# Patient Record
Sex: FEMALE | Race: WHITE | NOT HISPANIC OR LATINO | Employment: UNEMPLOYED | URBAN - NONMETROPOLITAN AREA
[De-identification: names, ages, dates, MRNs, and addresses within clinical notes are randomized per-mention and may not be internally consistent; named-entity substitution may affect disease eponyms.]

---

## 2018-08-09 ENCOUNTER — CONSULT (OUTPATIENT)
Dept: CARDIOLOGY | Facility: CLINIC | Age: 63
End: 2018-08-09

## 2018-08-09 VITALS
WEIGHT: 110 LBS | BODY MASS INDEX: 18.78 KG/M2 | HEIGHT: 64 IN | SYSTOLIC BLOOD PRESSURE: 120 MMHG | DIASTOLIC BLOOD PRESSURE: 72 MMHG | HEART RATE: 58 BPM

## 2018-08-09 DIAGNOSIS — I77.9 CAROTID ARTERY DISEASE, UNSPECIFIED LATERALITY (HCC): ICD-10-CM

## 2018-08-09 DIAGNOSIS — E78.00 HYPERCHOLESTEREMIA: ICD-10-CM

## 2018-08-09 DIAGNOSIS — I73.9 PAD (PERIPHERAL ARTERY DISEASE) (HCC): ICD-10-CM

## 2018-08-09 DIAGNOSIS — R12 HEART BURN: ICD-10-CM

## 2018-08-09 DIAGNOSIS — Z72.0 TOBACCO ABUSE: ICD-10-CM

## 2018-08-09 DIAGNOSIS — I10 ESSENTIAL HYPERTENSION: ICD-10-CM

## 2018-08-09 DIAGNOSIS — R00.1 BRADYCARDIA: ICD-10-CM

## 2018-08-09 DIAGNOSIS — I25.6 SILENT MYOCARDIAL ISCHEMIA: ICD-10-CM

## 2018-08-09 DIAGNOSIS — I25.9 IHD (ISCHEMIC HEART DISEASE): Primary | ICD-10-CM

## 2018-08-09 DIAGNOSIS — R01.1 MURMUR, CARDIAC: ICD-10-CM

## 2018-08-09 DIAGNOSIS — K21.9 GASTROESOPHAGEAL REFLUX DISEASE, ESOPHAGITIS PRESENCE NOT SPECIFIED: ICD-10-CM

## 2018-08-09 PROCEDURE — 99204 OFFICE O/P NEW MOD 45 MIN: CPT | Performed by: INTERNAL MEDICINE

## 2018-08-09 PROCEDURE — 93000 ELECTROCARDIOGRAM COMPLETE: CPT | Performed by: INTERNAL MEDICINE

## 2018-08-09 PROCEDURE — 99406 BEHAV CHNG SMOKING 3-10 MIN: CPT | Performed by: INTERNAL MEDICINE

## 2018-08-09 RX ORDER — ATORVASTATIN CALCIUM 20 MG/1
20 TABLET, FILM COATED ORAL DAILY
Qty: 30 TABLET | Refills: 11 | Status: SHIPPED | OUTPATIENT
Start: 2018-08-09 | End: 2019-09-14 | Stop reason: SDUPTHER

## 2018-08-09 RX ORDER — ASPIRIN 81 MG/1
81 TABLET ORAL DAILY
COMMUNITY
End: 2020-02-26 | Stop reason: SDUPTHER

## 2018-08-09 RX ORDER — QUETIAPINE FUMARATE 300 MG/1
300 TABLET, FILM COATED ORAL NIGHTLY
COMMUNITY

## 2018-08-09 RX ORDER — HYDROCHLOROTHIAZIDE 25 MG/1
25 TABLET ORAL DAILY
COMMUNITY
End: 2020-02-26 | Stop reason: SDUPTHER

## 2018-08-09 RX ORDER — PANTOPRAZOLE SODIUM 40 MG/1
40 TABLET, DELAYED RELEASE ORAL DAILY
COMMUNITY

## 2018-08-09 RX ORDER — SUCRALFATE 1 G/1
1 TABLET ORAL 4 TIMES DAILY
COMMUNITY
End: 2020-02-26 | Stop reason: ALTCHOICE

## 2018-08-09 RX ORDER — AMLODIPINE BESYLATE 10 MG/1
10 TABLET ORAL DAILY
COMMUNITY
End: 2020-02-26 | Stop reason: SDUPTHER

## 2018-08-09 RX ORDER — VARENICLINE TARTRATE 1 MG/1
1 TABLET, FILM COATED ORAL 2 TIMES DAILY
COMMUNITY
End: 2019-06-26 | Stop reason: ALTCHOICE

## 2018-08-09 RX ORDER — METOPROLOL SUCCINATE 25 MG/1
25 TABLET, EXTENDED RELEASE ORAL 2 TIMES DAILY
COMMUNITY
End: 2020-02-26 | Stop reason: SDUPTHER

## 2018-08-09 RX ORDER — GABAPENTIN 300 MG/1
300 CAPSULE ORAL 3 TIMES DAILY
COMMUNITY

## 2018-08-09 RX ORDER — BUSPIRONE HYDROCHLORIDE 15 MG/1
15 TABLET ORAL 2 TIMES DAILY PRN
COMMUNITY

## 2018-08-09 RX ORDER — ONDANSETRON 4 MG/1
4 TABLET, FILM COATED ORAL EVERY 8 HOURS PRN
COMMUNITY

## 2018-08-09 NOTE — PROGRESS NOTES
CARDIAC COMPLAINTS  dyspnea, fatigue, heartburn and leg pain      Subjective   Vikki Herrmann is a 62 y.o. female came in today for her initial cardiac evaluation.  She has history of ischemic heart disease, history of peripheral was  been in [place] till recently.  She moved to [place] and wants to be followed here.  She is not sure where she had most of her procedures.  We been able to obtain some reports from [facility].  She was first diagnosed with peripheral vascular disease in the form of significant distal aortic stenosis for which she possibly had aorta bifemoral bypass surgery.  She then underwent multiple stenting of the legs because of worsening of the graft as well as the native vessel.  About a year ago, she was admitted with chest pain and shortness of breath.  And underwent stress test which was positive.  She had cardiac catheterization and stenting of the right coronary artery.  She was also diagnosed with significant carotid artery disease.  She did not follow-up with her vascular surgeon regarding that.  She is not taking any statins, she is not taking any antiplatelet medication.  She is not even sure when she had the stents put in.  She think it was early last year.  She apparently had labs done and was told her cholesterol is mildly elevated.  She unfortunately still continues to smoke.    Past Surgical History:   Procedure Laterality Date   • CARDIAC CATHETERIZATION  10/16/2017    @ [place]. 85% Ostial RCA-3.25x15 Xience       Current Outpatient Prescriptions   Medication Sig Dispense Refill   • amLODIPine (NORVASC) 10 MG tablet Take 10 mg by mouth Daily.     • aspirin 81 MG EC tablet Take 81 mg by mouth Daily.     • busPIRone (BUSPAR) 15 MG tablet Take 15 mg by mouth 2 (Two) Times a Day As Needed.     • gabapentin (NEURONTIN) 300 MG capsule Take 300 mg by mouth 3 (Three) Times a Day.     • hydrochlorothiazide (HYDRODIURIL) 25 MG tablet Take 25 mg by mouth Daily.     • metoprolol  succinate XL (TOPROL-XL) 25 MG 24 hr tablet Take 25 mg by mouth 2 (Two) Times a Day.     • ondansetron (ZOFRAN) 4 MG tablet Take 4 mg by mouth Every 8 (Eight) Hours As Needed for Nausea or Vomiting.     • pantoprazole (PROTONIX) 40 MG EC tablet Take 40 mg by mouth Daily.     • QUEtiapine (SEROquel) 300 MG tablet Take 300 mg by mouth Every Night.     • sucralfate (CARAFATE) 1 g tablet Take 1 g by mouth 4 (Four) Times a Day.     • varenicline (CHANTIX) 1 MG tablet Take 1 mg by mouth 2 (Two) Times a Day.     • atorvastatin (LIPITOR) 20 MG tablet Take 1 tablet by mouth Daily. 30 tablet 11   • nicotine (NICOTROL) 10 MG inhaler Inhale 1 puff As Needed for Smoking Cessation. 168 inhaler 5   • ticagrelor (BRILINTA) 60 MG tablet tablet Take 1 tablet by mouth 2 (Two) Times a Day. 60 tablet 8     No current facility-administered medications for this visit.            ALLERGIES:  Patient has no known allergies.    Past Medical History:   Diagnosis Date   • Anxiety    • Coronary artery disease     s/p stenting    • Depression    • GERD (gastroesophageal reflux disease)    • History of cholecystectomy    • History of partial hysterectomy    • Hyperlipidemia    • Hypertension    • PVD (peripheral vascular disease) (CMS/HCC)     s/p multiple stents   • S/P AAA repair    • Uterine cancer (CMS/HCC)        History   Smoking Status   • Current Every Day Smoker   • Packs/day: 0.25   • Years: 50.00   • Types: Cigarettes   Smokeless Tobacco   • Never Used          Family History   Problem Relation Age of Onset   • Stroke Mother    • Cancer Father        Review of Systems   Constitution: Positive for weakness. Negative for decreased appetite and malaise/fatigue.   HENT: Negative for congestion and sore throat.    Eyes: Negative for blurred vision.   Cardiovascular: Positive for dyspnea on exertion. Negative for chest pain.   Respiratory: Positive for shortness of breath. Negative for snoring.    Endocrine: Negative for cold intolerance and  "heat intolerance.   Hematologic/Lymphatic: Negative for adenopathy. Does not bruise/bleed easily.   Skin: Negative for itching, nail changes and skin cancer.   Musculoskeletal: Positive for arthritis and muscle weakness. Negative for myalgias.   Gastrointestinal: Negative for abdominal pain, dysphagia and heartburn.   Genitourinary: Negative for bladder incontinence and frequency.   Neurological: Negative for dizziness, light-headedness, seizures and vertigo.   Psychiatric/Behavioral: Negative for altered mental status.   Allergic/Immunologic: Negative for environmental allergies and hives.       Diabetes- No  Thyroid- normal    Objective     /72 (BP Location: Right arm)   Pulse 58   Ht 162.6 cm (64\")   Wt 49.9 kg (110 lb)   BMI 18.88 kg/m²     Physical Exam   Constitutional: She is oriented to person, place, and time. She appears well-developed and well-nourished.   HENT:   Head: Normocephalic.   Eyes: Pupils are equal, round, and reactive to light. EOM are normal.   Neck: Normal range of motion. Neck supple. Carotid bruit is present.   Cardiovascular: Normal rate, regular rhythm, S1 normal and S2 normal.    Murmur heard.  Pulmonary/Chest: Effort normal and breath sounds normal.   Abdominal: Soft. Bowel sounds are normal.   Musculoskeletal: Normal range of motion. She exhibits no edema.   Neurological: She is alert and oriented to person, place, and time.   Skin: Skin is warm and dry.   Psychiatric: She has a normal mood and affect.         ECG 12 Lead  Date/Time: 8/9/2018 5:52 PM  Performed by: RUDDY PARKINSON  Authorized by: RUDDY PARKINSON   Previous ECG: no previous ECG available  Rhythm: sinus bradycardia  Rate: bradycardic  QRS axis: normal  Clinical impression: non-specific ECG              Assessment/Plan   Patient's Body mass index is 18.88 kg/m². BMI is within normal parameters. No follow-up required.     Vikki was seen today for establish care, coronary artery disease, pvd, carotid " artery disease, post-op aneurysm repair and labs.    Diagnoses and all orders for this visit:    IHD (ischemic heart disease)  -     ticagrelor (BRILINTA) 60 MG tablet tablet; Take 1 tablet by mouth 2 (Two) Times a Day.  -     Stress Test With Myocardial Perfusion One Day; Future    PAD (peripheral artery disease) (CMS/HCC)  -     ticagrelor (BRILINTA) 60 MG tablet tablet; Take 1 tablet by mouth 2 (Two) Times a Day.    Silent myocardial ischemia  -     Stress Test With Myocardial Perfusion One Day; Future    Essential hypertension    Carotid artery disease, unspecified laterality (CMS/HCC)    Tobacco abuse  -     nicotine (NICOTROL) 10 MG inhaler; Inhale 1 puff As Needed for Smoking Cessation.    Bradycardia  -     Adult Transthoracic Echo Complete W/ Cont if Necessary Per Protocol; Future    Murmur, cardiac  -     Adult Transthoracic Echo Complete W/ Cont if Necessary Per Protocol; Future    Hypercholesteremia  -     atorvastatin (LIPITOR) 20 MG tablet; Take 1 tablet by mouth Daily.    Gastroesophageal reflux disease, esophagitis presence not specified    Heart burn  -     Stress Test With Myocardial Perfusion One Day; Future       At baseline her heart rate and blood pressure appears stable.  Her BMI is 19.  Her EKG showed sinus bradycardia with nonspecific ST-T changes. Her clinical examination reveals decreased circulation in her lower extremities.  I had a very long talk with her about the tobacco use.  She agreed to try quitting with the use of Nicotrol inhaler.  I also started her on Lipitor 20 mg once a day.  Because of the ischemic heart disease and peripheral spread disease I started her on Brilinta at 60 mg twice a day.  After she left our office, I was able to get the catheterization report and it was done in October of last year.  So I need to increase to dose of medication to 90 mg twice a day.  I also scheduled her to undergo an echocardiogram to evaluate the LV function.  She also need to repeat a  stress test to rule out any ischemia.  She does have significant carotid bruit.  If she is not able to find a CTA CD, then she may need to undergo a CTA again.  I will see her back in few months or sooner if needed.             Electronically signed by Luis Miguel Rodríguez MD August 9, 2018 5:44 PM

## 2018-08-09 NOTE — PROGRESS NOTES
I advised Vikki of the risks of continuing to use tobacco, and I provided her with tobacco cessation educational materials in the After Visit Summary.     During this visit, I spent 4-5 minutes counseling the patient regarding tobacco cessation.

## 2018-08-10 ENCOUNTER — TELEPHONE (OUTPATIENT)
Dept: CARDIOLOGY | Facility: CLINIC | Age: 63
End: 2018-08-10

## 2018-08-10 NOTE — TELEPHONE ENCOUNTER
LM for pt to call us back if she still could not find the CD,  that the test would need to be repeated.

## 2018-08-10 NOTE — TELEPHONE ENCOUNTER
Spoke with pt, confirmed that she is not on any other antiplatelet except ASA. Advised her that since her stenting was Oct 2017, within the past year,  we would be sending in Brilinta 90 mg BID instead of 60 mg BID, to double check with the pharmacy to make sure they gave her the correct dose.  States to let you know she has not found her CD for her carotid testing yet. Script sent to pharmacy.

## 2018-08-16 ENCOUNTER — APPOINTMENT (OUTPATIENT)
Dept: CARDIOLOGY | Facility: HOSPITAL | Age: 63
End: 2018-08-16
Attending: INTERNAL MEDICINE

## 2018-08-21 NOTE — TELEPHONE ENCOUNTER
Spoke with patient.  She is going to contact the hospital she had the Carotid CTA completed to get a copy of report/CD.

## 2018-08-22 ENCOUNTER — HOSPITAL ENCOUNTER (OUTPATIENT)
Dept: CARDIOLOGY | Facility: HOSPITAL | Age: 63
Discharge: HOME | End: 2018-08-22
Attending: INTERNAL MEDICINE

## 2018-08-22 DIAGNOSIS — R12 HEART BURN: ICD-10-CM

## 2018-08-22 DIAGNOSIS — I25.9 IHD (ISCHEMIC HEART DISEASE): ICD-10-CM

## 2018-08-22 DIAGNOSIS — I25.6 SILENT MYOCARDIAL ISCHEMIA: ICD-10-CM

## 2018-08-22 DIAGNOSIS — R00.1 BRADYCARDIA: ICD-10-CM

## 2018-08-22 DIAGNOSIS — R01.1 MURMUR, CARDIAC: ICD-10-CM

## 2018-08-22 LAB
MAXIMAL PREDICTED HEART RATE: 158 BPM
MAXIMAL PREDICTED HEART RATE: 158 BPM
STRESS TARGET HR: 134 BPM
STRESS TARGET HR: 134 BPM

## 2018-08-22 PROCEDURE — 93018 CV STRESS TEST I&R ONLY: CPT | Performed by: INTERNAL MEDICINE

## 2018-08-22 PROCEDURE — 0 TECHNETIUM SESTAMIBI: Performed by: INTERNAL MEDICINE

## 2018-08-22 PROCEDURE — 93306 TTE W/DOPPLER COMPLETE: CPT

## 2018-08-22 PROCEDURE — 78452 HT MUSCLE IMAGE SPECT MULT: CPT | Performed by: INTERNAL MEDICINE

## 2018-08-22 PROCEDURE — 78452 HT MUSCLE IMAGE SPECT MULT: CPT

## 2018-08-22 PROCEDURE — A9500 TC99M SESTAMIBI: HCPCS | Performed by: INTERNAL MEDICINE

## 2018-08-22 PROCEDURE — 93306 TTE W/DOPPLER COMPLETE: CPT | Performed by: INTERNAL MEDICINE

## 2018-08-22 PROCEDURE — 25010000002 REGADENOSON 0.4 MG/5ML SOLUTION: Performed by: INTERNAL MEDICINE

## 2018-08-22 PROCEDURE — 93017 CV STRESS TEST TRACING ONLY: CPT

## 2018-08-22 RX ADMIN — REGADENOSON 0.4 MG: 0.08 INJECTION, SOLUTION INTRAVENOUS at 09:02

## 2018-08-22 RX ADMIN — TECHNETIUM TC 99M SESTAMIBI 1 DOSE: 1 INJECTION INTRAVENOUS at 09:02

## 2018-08-24 ENCOUNTER — TELEPHONE (OUTPATIENT)
Dept: CARDIOLOGY | Facility: CLINIC | Age: 63
End: 2018-08-24

## 2018-08-24 NOTE — TELEPHONE ENCOUNTER
Attempted to notify patient of echo and stress test results unable to reach via phone, left message for patient to call back.

## 2018-08-29 RX ORDER — ISOSORBIDE MONONITRATE 30 MG/1
30 TABLET, EXTENDED RELEASE ORAL DAILY
Qty: 30 TABLET | Refills: 3 | Status: SHIPPED | OUTPATIENT
Start: 2018-08-29 | End: 2019-01-25 | Stop reason: SDUPTHER

## 2018-08-29 NOTE — TELEPHONE ENCOUNTER
Patient aware of stress test and echo results and recommendations.  Add Imdur 30 mg once a day and patient aware to call if symptoms persist.

## 2019-01-25 RX ORDER — ISOSORBIDE MONONITRATE 30 MG/1
30 TABLET, EXTENDED RELEASE ORAL DAILY
Qty: 30 TABLET | Refills: 0 | Status: SHIPPED | OUTPATIENT
Start: 2019-01-25 | End: 2019-03-11 | Stop reason: SDUPTHER

## 2019-02-21 RX ORDER — ISOSORBIDE MONONITRATE 30 MG/1
TABLET, EXTENDED RELEASE ORAL
Qty: 90 TABLET | OUTPATIENT
Start: 2019-02-21

## 2019-03-11 ENCOUNTER — TELEPHONE (OUTPATIENT)
Dept: CARDIOLOGY | Facility: CLINIC | Age: 64
End: 2019-03-11

## 2019-03-11 RX ORDER — ISOSORBIDE MONONITRATE 30 MG/1
30 TABLET, EXTENDED RELEASE ORAL DAILY
Qty: 30 TABLET | Refills: 0 | Status: SHIPPED | OUTPATIENT
Start: 2019-03-11 | End: 2019-04-22 | Stop reason: SDUPTHER

## 2019-03-11 NOTE — TELEPHONE ENCOUNTER
Patient called needing refills on Isosorbide. She has been unable to keep appointments due to transportation issues.    Is it ok to send script for  Isosorbide?

## 2019-03-11 NOTE — TELEPHONE ENCOUNTER
Script for 30 days supply sent to [facility] pharmacy.

## 2019-04-22 ENCOUNTER — TELEPHONE (OUTPATIENT)
Dept: CARDIOLOGY | Facility: CLINIC | Age: 64
End: 2019-04-22

## 2019-04-22 RX ORDER — ISOSORBIDE MONONITRATE 30 MG/1
30 TABLET, EXTENDED RELEASE ORAL DAILY
Qty: 30 TABLET | Refills: 0 | Status: SHIPPED | OUTPATIENT
Start: 2019-04-22 | End: 2019-05-15 | Stop reason: SDUPTHER

## 2019-04-22 NOTE — TELEPHONE ENCOUNTER
-Pt has an appointment to be seen May 15th at [place], will refill Imdur until that visit. Pt aware that if she is unable to get to that appointment she will need to check with PCP regarding further refills.

## 2019-05-15 ENCOUNTER — TELEPHONE (OUTPATIENT)
Dept: CARDIOLOGY | Facility: CLINIC | Age: 64
End: 2019-05-15

## 2019-05-15 RX ORDER — ISOSORBIDE MONONITRATE 30 MG/1
TABLET, EXTENDED RELEASE ORAL
Qty: 30 TABLET | Refills: 1 | Status: SHIPPED | OUTPATIENT
Start: 2019-05-15 | End: 2020-02-26 | Stop reason: SDUPTHER

## 2019-05-15 NOTE — TELEPHONE ENCOUNTER
PATIENT CAME IN FOR HER APT IN [place].  SHE HAD NOT GOTTEN THE LETTER TO RESCHEDULE HER APT.  (HER PHONE IS SHUT OFF A FEW DAYS A MONTH DUE TO BILLING).  SHE HAD MISSED/CANCELED DUE TO TRANSPORTATION ALL APT'S SINCE HER CONSULT 8-19-18.   WE RESCHEDULED HER APT FOR June, DUE TO HER BEING OUT OF TOWN.  CAN YOU CALL IN REFILL ON ISOSORBIDE TO LAST HER UNTIL 6-26-19 TO [facility]

## 2019-06-21 NOTE — PROGRESS NOTES
Chief Complaint   Patient presents with   • Follow-up     For cardiac management. Patient is on aspirin. Reports that she does sometimes have shortness of breath with going up steps. States that her legs feels like they are getting worse. Last lab work was done earlier this month when she went to ER and found that potassium was low, in chart under labs.    • Med Refill     Needs refills on Brilinta. 90 day supplies to [facility] in [place].        Cardiac Complaints  claudication and dyspnea      Subjective   Vikki Herrmann is a 63 y.o. female with HTN, hyperlipidemia, PVD, and ischemic heart disease.  She was followed in [place] till moving to [place] in 2018. She is not sure where she had most of her procedures, but we were able to obtain some reports from [facility].  She was first diagnosed with peripheral vascular disease in the form of significant distal aortic stenosis for which she possibly had aorta bifemoral bypass surgery.  She then underwent multiple stenting of the legs because of worsening grafts as well as the native vessel.  In 2017, she underwent stress test which was positive.  She had a cardiac catheterization and stenting of the right coronary artery.  She was also diagnosed with significant carotid artery disease.  She did not follow-up with her vascular surgeon regarding that.  At consult in 2018, she was not taking statins or antiplatelet therapy.  She was started on Brilinta 90mg BID at consult as well as statin therapy.  She was urged to repeat cardiac workup with stress and echo.  Stress showed small inferior wall ischemia and good LV function.  Imdur was added.  Carotid bruit was also noted at visit and it was advised if CTA films could not be found, CTA would need to be repeated.  She return today for follow up and reports more claudication pain of bilateral legs.  She states it feels like an ache in her legs and a tightness.  She does admit her potassium has been low  which she thinks is making it worse, as she will often cramp at night.  She does report some weakness of her legs occasionally.  Most recent labs were done at ER a few weeks ago when she went in for cramping, no copy available.  Refills of Brilinta requested for 90 day supply.  She does admit to continued tobacco abuse and smokes about 1/2 pack a day despite concerns.            Cardiac History  Past Surgical History:   Procedure Laterality Date   • CARDIAC CATHETERIZATION  10/16/2017    @ [place]. 85% Ostial RCA-3.25x15 Xience   • CARDIOVASCULAR STRESS TEST  08/22/2018    L. Cardiolite- EF 62%. Small Inferior Ischemia.   • ECHO - CONVERTED  10/09/2017    @ [place]. EF 55%. Anterior & anterolateral WMA   • ECHO - CONVERTED  08/22/2018    EF 65%. Mild MR & AI.       Current Outpatient Medications   Medication Sig Dispense Refill   • amLODIPine (NORVASC) 10 MG tablet Take 10 mg by mouth Daily.     • aspirin 81 MG EC tablet Take 81 mg by mouth Daily.     • atorvastatin (LIPITOR) 20 MG tablet Take 1 tablet by mouth Daily. 30 tablet 11   • busPIRone (BUSPAR) 15 MG tablet Take 15 mg by mouth 2 (Two) Times a Day As Needed.     • gabapentin (NEURONTIN) 300 MG capsule Take 300 mg by mouth 3 (Three) Times a Day.     • hydrochlorothiazide (HYDRODIURIL) 25 MG tablet Take 25 mg by mouth Daily.     • isosorbide mononitrate (IMDUR) 30 MG 24 hr tablet take 1 tablet by mouth once daily -- MUST MAKE APPT FOR MORE REFILLS 30 tablet 1   • linaclotide (LINZESS) 290 MCG capsule capsule Take 290 mcg by mouth Every Morning Before Breakfast.     • metoprolol succinate XL (TOPROL-XL) 25 MG 24 hr tablet Take 25 mg by mouth 2 (Two) Times a Day.     • naproxen (NAPROSYN) 375 MG tablet Take 375 mg by mouth 2 (Two) Times a Day.     • nicotine (NICOTROL) 10 MG inhaler Inhale 1 puff As Needed for Smoking Cessation. 168 inhaler 5   • ondansetron (ZOFRAN) 4 MG tablet Take 4 mg by mouth Every 8 (Eight) Hours As Needed for Nausea or Vomiting.     • pantoprazole  (PROTONIX) 40 MG EC tablet Take 40 mg by mouth Daily.     • potassium chloride (MICRO-K) 10 MEQ CR capsule Take 40 mEq by mouth 2 (Two) Times a Day.     • QUEtiapine (SEROquel) 300 MG tablet Take 300 mg by mouth Every Night.     • sucralfate (CARAFATE) 1 g tablet Take 1 g by mouth 4 (Four) Times a Day.     • ticagrelor (BRILINTA) 90 MG tablet tablet Take 1 tablet by mouth 2 (Two) Times a Day. 60 tablet 6     No current facility-administered medications for this visit.        Patient has no known allergies.    Past Medical History:   Diagnosis Date   • Anxiety    • Coronary artery disease     s/p stenting    • Depression    • GERD (gastroesophageal reflux disease)    • History of cholecystectomy    • History of partial hysterectomy    • Hyperlipidemia    • Hypertension    • PVD (peripheral vascular disease) (CMS/[facility])     s/p multiple stents   • S/P AAA repair    • Uterine cancer (CMS/[facility])        Social History     Socioeconomic History   • Marital status:      Spouse name: Not on file   • Number of children: Not on file   • Years of education: Not on file   • Highest education level: Not on file   Tobacco Use   • Smoking status: Current Every Day Smoker     Packs/day: 0.50     Years: 50.00     Pack years: 25.00     Types: Cigarettes   • Smokeless tobacco: Never Used   Substance and Sexual Activity   • Alcohol use: No     Frequency: Never   • Drug use: Yes     Frequency: 14.0 times per week     Types: Marijuana       Family History   Problem Relation Age of Onset   • Stroke Mother    • Cancer Father        Review of Systems   Constitution: Negative for weakness and malaise/fatigue.   Cardiovascular: Positive for claudication and dyspnea on exertion. Negative for chest pain, irregular heartbeat, leg swelling, near-syncope, orthopnea, palpitations and syncope.   Respiratory: Positive for shortness of breath. Negative for cough and wheezing.    Musculoskeletal: Positive for myalgias. Negative for back pain and  "joint pain.   Gastrointestinal: Negative for anorexia, heartburn, nausea and vomiting.   Genitourinary: Negative for dysuria, hematuria, hesitancy and nocturia.   Neurological: Negative for dizziness, light-headedness and loss of balance.   Psychiatric/Behavioral: Negative for depression and memory loss. The patient is nervous/anxious.            Objective     /90 (BP Location: Right arm)   Pulse 68   Ht 162.6 cm (64.02\")   Wt 50.3 kg (111 lb)   BMI 19.04 kg/m²     Physical Exam   Constitutional: She is oriented to person, place, and time. She appears well-developed and well-nourished.   HENT:   Head: Normocephalic and atraumatic.   Eyes: EOM are normal. Pupils are equal, round, and reactive to light.   Neck: Normal range of motion. Neck supple.   Cardiovascular: Normal rate and regular rhythm.   Murmur heard.  Pulmonary/Chest: Effort normal and breath sounds normal.   Abdominal: Soft.   Musculoskeletal: Normal range of motion.   Neurological: She is alert and oriented to person, place, and time.   Skin: Skin is warm and dry.   Psychiatric: She has a normal mood and affect. Her behavior is normal.       Procedures    Assessment/Plan     HR is stable today and regular.  Blood pressure is elevated at 152/90 but patient denies taking any of her medication yet today.  She was urged to take when she gets home to [facility] with elevated reading.  For now, same HTN regimen advised.  If remains elevated after medication therapy, patient will call for further recommendations.  She reports more claudication pain and weakness in her legs bilaterally.  Repeat CTA of abdomen with runoff recommended to assess areas of stenting, for stenosis, and to assess for AAA.  Order for CTA of carotids also advised as significant stenosis noted on carotid US without CTA for review and carotid bruits auscultated over both carotid arteries.  More recommendations to follow.  She is taking both her Brilinta therapy and ASA for CAD and PVD " management.  Bleeding and bruising denied. Imdur therapy continued for abnormal stress testing, chest pain denied. She is now back on her statin therapy as well for hyperlipidemia management and tolerance is reported.   Labs for hyperlipidemia she reports with your office, can we have most recent for review?  She does admit to some shortness of breath on exertion or climbing.  Patient reports this is nothing new or out of the ordinary, she attributes to ongoing tobacco use.  She does report some leg cramping and myalgias but thinks this is because her potassium has been low, but is now being replaced.  Smoking cessation encouraged once again, but she does not feel ready to quit.  BMI noted at 19.04, she was urged about dietary supplementation with protein shakes.  Unsure if she has been evaluated with CT scan of chest as she is a smoker. 6 month follow up recommended or sooner if needed.      Problems Addressed this Visit        Cardiovascular and Mediastinum    IHD (ischemic heart disease)    Hypercholesteremia    Carotid artery disease (CMS/HCC)    Relevant Orders    CT Angiogram Carotids    Comprehensive Metabolic Panel    CBC (No Diff)    Lipid Panel       Other    Tobacco abuse      Other Visit Diagnoses     Claudication (CMS/HCC)    -  Primary    Relevant Orders    CT Angio Abdominal Aorta Bilateral Iliofem Runoff With & Without Contrast    Comprehensive Metabolic Panel    CBC (No Diff)    Lipid Panel    PVD (peripheral vascular disease) with claudication (CMS/HCC)        Relevant Orders    CT Angio Abdominal Aorta Bilateral Iliofem Runoff With & Without Contrast    Comprehensive Metabolic Panel    CBC (No Diff)    Lipid Panel    Bruit (arterial)        Relevant Orders    CT Angiogram Carotids    Essential (primary) hypertension         Relevant Orders    CT Angio Abdominal Aorta Bilateral Iliofem Runoff With & Without Contrast    Comprehensive Metabolic Panel    CBC (No Diff)    Lipid Panel    Underweight               Patient's Body mass index is 19.04 kg/m². BMI is below normal parameters. Recommendations include: referral to primary care.          Electronically signed by POA Gant June 26, 2019 5:53 PM

## 2019-06-26 ENCOUNTER — OFFICE VISIT (OUTPATIENT)
Dept: CARDIOLOGY | Facility: CLINIC | Age: 64
End: 2019-06-26

## 2019-06-26 VITALS
BODY MASS INDEX: 18.95 KG/M2 | HEART RATE: 68 BPM | HEIGHT: 64 IN | WEIGHT: 111 LBS | DIASTOLIC BLOOD PRESSURE: 90 MMHG | SYSTOLIC BLOOD PRESSURE: 152 MMHG

## 2019-06-26 DIAGNOSIS — E78.00 HYPERCHOLESTEREMIA: ICD-10-CM

## 2019-06-26 DIAGNOSIS — R09.89 BRUIT (ARTERIAL): ICD-10-CM

## 2019-06-26 DIAGNOSIS — I25.9 IHD (ISCHEMIC HEART DISEASE): ICD-10-CM

## 2019-06-26 DIAGNOSIS — I65.23 BILATERAL CAROTID ARTERY STENOSIS: ICD-10-CM

## 2019-06-26 DIAGNOSIS — I73.9 CLAUDICATION (HCC): Primary | ICD-10-CM

## 2019-06-26 DIAGNOSIS — R63.6 UNDERWEIGHT: ICD-10-CM

## 2019-06-26 DIAGNOSIS — I10 ESSENTIAL (PRIMARY) HYPERTENSION: ICD-10-CM

## 2019-06-26 DIAGNOSIS — Z72.0 TOBACCO ABUSE: ICD-10-CM

## 2019-06-26 DIAGNOSIS — I73.9 PVD (PERIPHERAL VASCULAR DISEASE) WITH CLAUDICATION (HCC): ICD-10-CM

## 2019-06-26 PROCEDURE — 99214 OFFICE O/P EST MOD 30 MIN: CPT | Performed by: NURSE PRACTITIONER

## 2019-06-26 RX ORDER — NAPROXEN 375 MG/1
375 TABLET ORAL 2 TIMES DAILY
COMMUNITY
End: 2020-02-26 | Stop reason: ALTCHOICE

## 2019-06-26 RX ORDER — POTASSIUM CHLORIDE 750 MG/1
40 CAPSULE, EXTENDED RELEASE ORAL 2 TIMES DAILY
COMMUNITY
End: 2020-02-26 | Stop reason: ALTCHOICE

## 2019-07-01 RX ORDER — TICAGRELOR 90 MG/1
TABLET ORAL
Qty: 180 TABLET | Refills: 0 | Status: SHIPPED | OUTPATIENT
Start: 2019-07-01 | End: 2019-07-01 | Stop reason: SDUPTHER

## 2019-07-01 RX ORDER — TICAGRELOR 90 MG/1
TABLET ORAL
Qty: 180 TABLET | Refills: 1 | Status: SHIPPED | OUTPATIENT
Start: 2019-07-01 | End: 2020-02-07

## 2019-07-09 ENCOUNTER — TELEPHONE (OUTPATIENT)
Dept: CARDIOLOGY | Facility: CLINIC | Age: 64
End: 2019-07-09

## 2019-07-09 DIAGNOSIS — I73.9 PVD (PERIPHERAL VASCULAR DISEASE) WITH CLAUDICATION (HCC): Primary | ICD-10-CM

## 2019-07-09 NOTE — TELEPHONE ENCOUNTER
[facility] had denied CTA of abdomen with run off after reviewing recent office visit.  The denial letter states...  Based on [facility] PVD imaging guidelines, further imaging is not supported without results of an MAHI.

## 2019-08-01 ENCOUNTER — TELEPHONE (OUTPATIENT)
Dept: CARDIOLOGY | Facility: CLINIC | Age: 64
End: 2019-08-01

## 2019-08-01 NOTE — TELEPHONE ENCOUNTER
Please validate she is taking potassium supplements as ordered. If so, add extra 40 meq dose today. Maintain potassium rich diet. Recheck potassium next week. thanks

## 2019-08-01 NOTE — TELEPHONE ENCOUNTER
Received a phone call from [facility] reporting a critical lab value. Potassium is 3.1.

## 2019-08-02 NOTE — TELEPHONE ENCOUNTER
Patient was made aware that she needs to go to ER for hemoglobin of 7.7. Patient is currently at [facility] to have a test done and will go to the ER when that is done. Patient was also made aware that potassium is low at 3.1 and was made aware to take an extra 40 mEq today and to eat foods rich in potassium and to have lab work rechecked next week.

## 2019-08-02 NOTE — TELEPHONE ENCOUNTER
Per Jabari Barboza, patient needs to go to emergency room for hemoglobin of 7.7. I attempted to call patient number 3 times and it was not working. I called patient's emergency contact, Merlin Awan, who was not with patient and was at a yard sale. He reported that patient had dropped him off about 30 minutes ago and said she was going to the [facility] to have something done, but wasn't sure what it was. I called the  at [facility] and asked if patient was there for anything and they stated that there was no one there by that name at this time.

## 2019-08-07 ENCOUNTER — TELEPHONE (OUTPATIENT)
Dept: CARDIOLOGY | Facility: CLINIC | Age: 64
End: 2019-08-07

## 2019-08-07 DIAGNOSIS — I73.9 PVD (PERIPHERAL VASCULAR DISEASE) WITH CLAUDICATION (HCC): Primary | ICD-10-CM

## 2019-08-09 ENCOUNTER — TELEPHONE (OUTPATIENT)
Dept: CARDIOLOGY | Facility: CLINIC | Age: 64
End: 2019-08-09

## 2019-08-09 DIAGNOSIS — I73.9 PVD (PERIPHERAL VASCULAR DISEASE) WITH CLAUDICATION (HCC): Primary | ICD-10-CM

## 2019-08-09 DIAGNOSIS — I10 ESSENTIAL (PRIMARY) HYPERTENSION: ICD-10-CM

## 2019-08-09 DIAGNOSIS — R68.89 ABNORMAL ANKLE BRACHIAL INDEX (ABI): ICD-10-CM

## 2019-08-12 NOTE — TELEPHONE ENCOUNTER
I have scheduled for 08/14/19 at 9:30 am. CMP from 08/09/19 is on your desk. We are working on precert for CTA.

## 2019-08-13 ENCOUNTER — TELEPHONE (OUTPATIENT)
Dept: CARDIOLOGY | Facility: CLINIC | Age: 64
End: 2019-08-13

## 2019-08-13 NOTE — TELEPHONE ENCOUNTER
Is she taking her potassium?  Her K is the same as before at 3.1.  Does she notice any pills in her BM?  May need to switch to liquid for better absorption.

## 2019-08-13 NOTE — TELEPHONE ENCOUNTER
Patient states that she has been taking potassium twice a day. She states that she has not noticed any pills in stool. She is having CTA done tomorrow (08/14/19).

## 2019-08-14 ENCOUNTER — TELEPHONE (OUTPATIENT)
Dept: CARDIOLOGY | Facility: CLINIC | Age: 64
End: 2019-08-14

## 2019-08-14 DIAGNOSIS — E87.6 HYPOKALEMIA: Primary | ICD-10-CM

## 2019-08-14 RX ORDER — POTASSIUM CHLORIDE 20 MEQ/1
20 TABLET, EXTENDED RELEASE ORAL 3 TIMES DAILY
Qty: 21 TABLET | Refills: 0 | Status: SHIPPED | OUTPATIENT
Start: 2019-08-14 | End: 2019-08-29 | Stop reason: SDUPTHER

## 2019-08-14 NOTE — TELEPHONE ENCOUNTER
I spoke with patient about potassium dosing just to double check before giving her instructions on how to take potassium. She is taking potassium 20 mEq BID. She was made aware to increase it to 3 times a day for one week. Do you want to have her labs rechecked in a week? Also, she is wanting a script sent into reflect the potassium 20 mEq TID because she is almost out of her pills.      Also, she went for CTA today and they stuck her 8 times but were unable to do test, she is supposed to go back tomorrow to see if they can do it then.

## 2019-08-22 ENCOUNTER — TELEPHONE (OUTPATIENT)
Dept: CARDIOLOGY | Facility: CLINIC | Age: 64
End: 2019-08-22

## 2019-08-22 DIAGNOSIS — I73.9 PVD (PERIPHERAL VASCULAR DISEASE) WITH CLAUDICATION (HCC): Primary | ICD-10-CM

## 2019-08-22 NOTE — TELEPHONE ENCOUNTER
Order for referral to Dr. Astudillo in for PVD of the left iliac, noted as severe.  Right leg grafts patent

## 2019-08-23 NOTE — TELEPHONE ENCOUNTER
I called to let patient know about referral to Dr. Astudillo's office. She was made aware that he was in [place] and she said that she would not be able to get there. I told her that I was not sure if they did an outside clinic, but that I would check. I called Dr. Astudillo's office and they do a clinic in [place] and earliest appointment they have is on 09/20/19. I attempted to call patient to ask if [facility] would be a possibility, but was unable to contact.

## 2019-08-27 NOTE — TELEPHONE ENCOUNTER
I spoke with patient about seeing Dr. Astudillo in his [place] clinic. She states that she will have to see if she has transportation as that is an issue right now. She states that she will call me back when she knows something. In the mean time, I re-faxed order for potassium level to be drawn at [facility].     She is also asking if you have any other recommendations for PVD at this time?

## 2019-08-29 ENCOUNTER — TELEPHONE (OUTPATIENT)
Dept: CARDIOLOGY | Facility: CLINIC | Age: 64
End: 2019-08-29

## 2019-08-29 RX ORDER — POTASSIUM CHLORIDE 20 MEQ/1
20 TABLET, EXTENDED RELEASE ORAL 3 TIMES DAILY
Qty: 90 TABLET | Refills: 5 | Status: SHIPPED | OUTPATIENT
Start: 2019-08-29 | End: 2020-02-26 | Stop reason: ALTCHOICE

## 2019-08-29 NOTE — TELEPHONE ENCOUNTER
Patient was made aware to continue on aspirin and Brilinta both until she is able to see Dr. Astudillo.

## 2019-08-29 NOTE — TELEPHONE ENCOUNTER
Patient was made aware of results of potassium level. Patient was made aware to continue on potassium 20 mEq TID. Patient asked for script to be sent to [facility] in [place].

## 2019-09-04 ENCOUNTER — TELEPHONE (OUTPATIENT)
Dept: CARDIOLOGY | Facility: CLINIC | Age: 64
End: 2019-09-04

## 2019-09-04 NOTE — TELEPHONE ENCOUNTER
Patient on wait list, called to see if she could come in this evening, patient stated she did not have any transportation.

## 2019-09-14 DIAGNOSIS — E78.00 HYPERCHOLESTEREMIA: ICD-10-CM

## 2019-09-16 RX ORDER — ATORVASTATIN CALCIUM 20 MG/1
TABLET, FILM COATED ORAL
Qty: 90 TABLET | Refills: 1 | Status: SHIPPED | OUTPATIENT
Start: 2019-09-16 | End: 2020-02-26 | Stop reason: SDUPTHER

## 2019-10-17 ENCOUNTER — OFFICE VISIT (OUTPATIENT)
Dept: CARDIOLOGY | Facility: CLINIC | Age: 64
End: 2019-10-17

## 2019-10-17 VITALS
WEIGHT: 114 LBS | BODY MASS INDEX: 19.46 KG/M2 | DIASTOLIC BLOOD PRESSURE: 80 MMHG | HEART RATE: 74 BPM | OXYGEN SATURATION: 98 % | SYSTOLIC BLOOD PRESSURE: 116 MMHG | HEIGHT: 64 IN

## 2019-10-17 DIAGNOSIS — R93.89 ABNORMAL COMPUTED TOMOGRAPHY ANGIOGRAPHY (CTA): ICD-10-CM

## 2019-10-17 DIAGNOSIS — I77.9 CAROTID ARTERY DISEASE, UNSPECIFIED LATERALITY, UNSPECIFIED TYPE (HCC): ICD-10-CM

## 2019-10-17 DIAGNOSIS — I10 ESSENTIAL HYPERTENSION: ICD-10-CM

## 2019-10-17 DIAGNOSIS — I73.9 PAD (PERIPHERAL ARTERY DISEASE) (HCC): ICD-10-CM

## 2019-10-17 DIAGNOSIS — I25.6 SILENT MYOCARDIAL ISCHEMIA: ICD-10-CM

## 2019-10-17 DIAGNOSIS — E78.00 HYPERCHOLESTEREMIA: ICD-10-CM

## 2019-10-17 DIAGNOSIS — I25.9 IHD (ISCHEMIC HEART DISEASE): ICD-10-CM

## 2019-10-17 DIAGNOSIS — Z72.0 TOBACCO ABUSE: ICD-10-CM

## 2019-10-17 DIAGNOSIS — R09.89 BILATERAL CAROTID BRUITS: Primary | ICD-10-CM

## 2019-10-17 PROCEDURE — 99406 BEHAV CHNG SMOKING 3-10 MIN: CPT | Performed by: INTERNAL MEDICINE

## 2019-10-17 PROCEDURE — 99214 OFFICE O/P EST MOD 30 MIN: CPT | Performed by: INTERNAL MEDICINE

## 2019-10-17 RX ORDER — FERROUS SULFATE 325(65) MG
TABLET ORAL
Refills: 0 | COMMUNITY
Start: 2019-09-14 | End: 2020-02-26 | Stop reason: ALTCHOICE

## 2019-10-17 NOTE — PROGRESS NOTES
[facility] CARDIOLOGY  [address]  [place] [address]  Phone: [phone]  Fax: [phone]    10/17/2019    Chief Complaint   Patient presents with   • PVD (peripheral vascular disease) with claudication        History:   Vikki Herrmann is a 63 y.o. female seen in followup, She has compliant today of BLE pain with claudication.  Her feet bothers her most of the time.  She is only able to walk aobut 200 feet then she starts to get pain.  Left is worse than right.  No ulcers present. No edema. Positive right femoral pulses.  No left femoral pulses.  She is a current everyday smoker.  She has a history of stent placement in legs, heart and aortoiliac graft repair. Records ar pending. Iliac fem bypass in [place]. She is a current everyday smoker.  States that she tried and failed with Chantix in the past.     The chart and medications were reviewed.   Past Medical History:   Diagnosis Date   • Anxiety    • Coronary artery disease     s/p stenting    • Depression    • GERD (gastroesophageal reflux disease)    • History of cholecystectomy    • History of partial hysterectomy    • Hyperlipidemia    • Hypertension    • PVD (peripheral vascular disease) (CMS/HCC)     s/p multiple stents   • S/P AAA repair    • Uterine cancer (CMS/formerly [facility])        Past Surgical History:   Procedure Laterality Date   • CARDIAC CATHETERIZATION  10/16/2017    @ [place] 85% Ostial RCA-3.25x15 Xience   • CARDIOVASCULAR STRESS TEST  08/22/2018    L. Cardiolite- EF 62%. Small Inferior Ischemia.   • ECHO - CONVERTED  10/09/2017    @ [place]. EF 55%. Anterior & anterolateral WMA   • ECHO - CONVERTED  08/22/2018    EF 65%. Mild MR & AI.        Past Social History:  Social History     Socioeconomic History   • Marital status:      Spouse name: Not on file   • Number of children: Not on file   • Years of education: Not on file   • Highest education level: Not on file   Tobacco Use   • Smoking status: Current Every Day  Smoker     Packs/day: 0.50     Years: 50.00     Pack years: 25.00     Types: Cigarettes   • Smokeless tobacco: Never Used   Substance and Sexual Activity   • Alcohol use: No     Frequency: Never   • Drug use: Yes     Frequency: 14.0 times per week     Types: Marijuana       Past Family History:  Family History   Problem Relation Age of Onset   • Stroke Mother    • Cancer Father        Review of Systems:   Please see HPI  Constitution: No chills, no rigors, no unexplained weight loss or weight gain  Eyes:  No diplopia, no blurred vision, no loss of vision, conjunctiva is pink and sclera is anicteric  ENT:  No tinnitus, no otorrhea, no epistaxis, no sore throat   Respiratory: No cough, no hemoptysis  Cardiovascular: see HPI  Gastrointestinal: No nausea, no vomiting, no hematemesis, no diarrhea or constipation, no melena  Genitourinary: No frequency of dysuria no hematuria  Integument: No pruritis and  no skin rash  Hematologic / Lymphatic: No excessive bleeding, easy bruising, fatigue, lymphadenopathy and petechiae  Musculoskeletal: No joint pain, joint stiffness, joint swelling, muscle pain, muscle weakness and neck pain  Neurological: No dizziness, headaches, light headedness, seizures and vertigo  Endocrine: No frequent urination and nocturia, temperature intolerance, weight gain, unintended and weight loss, unintended      No current outpatient medications on file.     No current facility-administered medications for this visit.         No Known Allergies    Objective:  Vitals:    10/17/19 1355   BP: 116/80   Pulse: 74   SpO2: 98%     Comfortable NAD  PERRL, conjunctiva clear  Neck supple, no JVD or thyromegaly appreciated  S1/S2 RRR, no m/r/g  Lungs CTA B, normal effort  Abdomen S/NT/ND (+) BS, no HSM appreciated  Extremities warm, no clubbing, cyanosis, or edema  Normal gait  No visible or palpable skin lesions  A/Ox4, mood and affect appropriate  Pulse exam:    Feet are warm bilateral  1+ edema  bilateral  Capillary refill is normal  No evidence of ulceration or color change of the toes  PULSES  Right DP and PT are 1+ and Left DP and PT are 2+    DATA:      Results for orders placed during the hospital encounter of 08/22/18   Adult Transthoracic Echo Complete W/ Cont if Necessary Per Protocol      Results for orders placed during the hospital encounter of 08/22/18   Stress Test With Myocardial Perfusion One Day      Results for orders placed during the hospital encounter of 08/22/18   Stress Test With Myocardial Perfusion One Day       Procedures       Assessment  Severe PAD with left leg symptoms worse than right  Hx of peripheral arterial stenting in the past.  Abnormal MAHI  CAD s/p stent placements  H/X aortoiliac graft repair.   Dyslipidemia  Essential hypertension  Tobacco abuse    Plan  I will proceed with a peripheral angiogram left.   I have explained the risks associated with the procedure to the patient including but not limited to an allergic reaction to the contrast material or medications used during the procedure bleeding, infection, and bruising at the catheter insertion site blood clots, which may trigger heart attack, stroke,   damage to the artery where the catheter was inserted, or damage to the arteries as the catheter travels through your body, irregular heart rhythm arrhythmias, kidney damage caused by the contrast material.  I extensively discussed consequences of smoking namely cardiovascular/metabolic, lung cancer, mouth cancer, pulmonary disorder including COPD and reduced quality of life.  At the end of the conversation, patient voices understanding of the risk involved in smoking.  Patient also understands the benefits of quitting.  I provided the patient smoking cessation material. Patient displayed understanding and stated that he will try to quit smoking.  During this visit I spent 10 minutes counseling the patient regarding the smoking cessation.     Patient's Body mass index  is 19.57 kg/m². BMI is within normal parameters. No follow-up required..         ICD-10-CM ICD-9-CM   1. Bilateral carotid bruits R09.89 785.9   2. PAD (peripheral artery disease) (CMS/HCC) I73.9 443.9   3. IHD (ischemic heart disease) I25.9 414.9   4. Carotid artery disease, unspecified laterality, unspecified type (CMS/HCC) I73.9 447.9   5. Tobacco abuse Z72.0 305.1   6. Essential hypertension I10 401.9   7. Hypercholesteremia E78.00 272.0   8. Abnormal computed tomography angiography (CTA) R93.89 793.99   9. Silent myocardial ischemia I25.6 414.8        Thank you for allowing me to participate in the care of Vikki Herrmann. Feel free to contact me directly with any further questions or concerns.        Director, Cardiac Cath Lab    COURTNEY [place]wood, acting as scribe for Qamar Astudillo MD, [facility], [facility].   10/30/19  9:27 AM    I have read and agree the documentation that has been completed regarding this visit.  By signing this record, I attest that the documentation was completed by my physical presence and is an accurate record of the encounter.  Voice recognition / transcription technology used for some documentation in this chart in attempt to mitigate substantial inefficiencies created by this electronic health record technology.  As a result, there may be some typos and.or nonsensical language introduced into the chart that either overlooked in editing/review and/or that I am unable to correct as patient care needs require me to prioritize my attention to bedside patient care rather than electronic documentation. MA

## 2019-10-22 ENCOUNTER — RESULTS ENCOUNTER (OUTPATIENT)
Dept: CARDIOLOGY | Facility: CLINIC | Age: 64
End: 2019-10-22

## 2019-10-22 DIAGNOSIS — E78.00 HYPERCHOLESTEREMIA: ICD-10-CM

## 2019-10-22 DIAGNOSIS — I10 ESSENTIAL HYPERTENSION: ICD-10-CM

## 2019-10-22 DIAGNOSIS — I73.9 PAD (PERIPHERAL ARTERY DISEASE) (HCC): ICD-10-CM

## 2019-10-22 DIAGNOSIS — Z72.0 TOBACCO ABUSE: ICD-10-CM

## 2019-10-22 DIAGNOSIS — I25.9 IHD (ISCHEMIC HEART DISEASE): ICD-10-CM

## 2019-10-22 PROBLEM — R93.89 ABNORMAL COMPUTED TOMOGRAPHY ANGIOGRAPHY (CTA): Status: ACTIVE | Noted: 2019-10-22

## 2019-10-22 PROBLEM — R09.89 BILATERAL CAROTID BRUITS: Status: ACTIVE | Noted: 2019-10-22

## 2019-10-30 ENCOUNTER — HOSPITAL ENCOUNTER (OUTPATIENT)
Facility: HOSPITAL | Age: 64
Discharge: HOME | End: 2019-10-30
Attending: INTERNAL MEDICINE | Admitting: INTERNAL MEDICINE

## 2019-10-30 VITALS
HEART RATE: 69 BPM | RESPIRATION RATE: 16 BRPM | DIASTOLIC BLOOD PRESSURE: 100 MMHG | SYSTOLIC BLOOD PRESSURE: 150 MMHG | TEMPERATURE: 98.1 F | BODY MASS INDEX: 19.63 KG/M2 | OXYGEN SATURATION: 99 % | WEIGHT: 115 LBS | HEIGHT: 64 IN

## 2019-10-30 DIAGNOSIS — I25.9 IHD (ISCHEMIC HEART DISEASE): ICD-10-CM

## 2019-10-30 DIAGNOSIS — I73.9 PAD (PERIPHERAL ARTERY DISEASE) (HCC): ICD-10-CM

## 2019-10-30 DIAGNOSIS — I10 ESSENTIAL HYPERTENSION: ICD-10-CM

## 2019-10-30 DIAGNOSIS — R93.89 ABNORMAL COMPUTED TOMOGRAPHY ANGIOGRAPHY (CTA): ICD-10-CM

## 2019-10-30 DIAGNOSIS — R09.89 BILATERAL CAROTID BRUITS: ICD-10-CM

## 2019-10-30 LAB
ALBUMIN SERPL-MCNC: 4.53 G/DL (ref 3.5–5.2)
ALBUMIN/GLOB SERPL: 1.1 G/DL
ALP SERPL-CCNC: 160 U/L (ref 39–117)
ALT SERPL W P-5'-P-CCNC: 10 U/L (ref 1–33)
ANION GAP SERPL CALCULATED.3IONS-SCNC: 12.6 MMOL/L (ref 5–15)
AST SERPL-CCNC: 19 U/L (ref 1–32)
BASOPHILS # BLD AUTO: 0.08 10*3/MM3 (ref 0–0.2)
BASOPHILS NFR BLD AUTO: 0.9 % (ref 0–1.5)
BILIRUB SERPL-MCNC: 0.2 MG/DL (ref 0.2–1.2)
BUN BLD-MCNC: 24 MG/DL (ref 8–23)
BUN/CREAT SERPL: 17.1 (ref 7–25)
CALCIUM SPEC-SCNC: 9.9 MG/DL (ref 8.6–10.5)
CHLORIDE SERPL-SCNC: 108 MMOL/L (ref 98–107)
CO2 SERPL-SCNC: 22.4 MMOL/L (ref 22–29)
CREAT BLD-MCNC: 1.4 MG/DL (ref 0.57–1)
DEPRECATED RDW RBC AUTO: 61 FL (ref 37–54)
EOSINOPHIL # BLD AUTO: 0.22 10*3/MM3 (ref 0–0.4)
EOSINOPHIL NFR BLD AUTO: 2.5 % (ref 0.3–6.2)
ERYTHROCYTE [DISTWIDTH] IN BLOOD BY AUTOMATED COUNT: 17.6 % (ref 12.3–15.4)
GFR SERPL CREATININE-BSD FRML MDRD: 38 ML/MIN/1.73
GLOBULIN UR ELPH-MCNC: 4 GM/DL
GLUCOSE BLD-MCNC: 112 MG/DL (ref 65–99)
HCT VFR BLD AUTO: 38 % (ref 34–46.6)
HGB BLD-MCNC: 11.7 G/DL (ref 12–15.9)
IMM GRANULOCYTES # BLD AUTO: 0.04 10*3/MM3 (ref 0–0.05)
IMM GRANULOCYTES NFR BLD AUTO: 0.5 % (ref 0–0.5)
INR PPP: 0.95 (ref 0.9–1.1)
LYMPHOCYTES # BLD AUTO: 1.42 10*3/MM3 (ref 0.7–3.1)
LYMPHOCYTES NFR BLD AUTO: 16.3 % (ref 19.6–45.3)
MCH RBC QN AUTO: 29.2 PG (ref 26.6–33)
MCHC RBC AUTO-ENTMCNC: 30.8 G/DL (ref 31.5–35.7)
MCV RBC AUTO: 94.8 FL (ref 79–97)
MONOCYTES # BLD AUTO: 0.78 10*3/MM3 (ref 0.1–0.9)
MONOCYTES NFR BLD AUTO: 9 % (ref 5–12)
NEUTROPHILS # BLD AUTO: 6.16 10*3/MM3 (ref 1.7–7)
NEUTROPHILS NFR BLD AUTO: 70.8 % (ref 42.7–76)
NRBC BLD AUTO-RTO: 0 /100 WBC (ref 0–0.2)
PLATELET # BLD AUTO: 243 10*3/MM3 (ref 140–450)
PMV BLD AUTO: 12.1 FL (ref 6–12)
POTASSIUM BLD-SCNC: 4.9 MMOL/L (ref 3.5–5.2)
PROT SERPL-MCNC: 8.5 G/DL (ref 6–8.5)
PROTHROMBIN TIME: 13.2 SECONDS (ref 11–15.4)
RBC # BLD AUTO: 4.01 10*6/MM3 (ref 3.77–5.28)
SODIUM BLD-SCNC: 143 MMOL/L (ref 136–145)
WBC NRBC COR # BLD: 8.7 10*3/MM3 (ref 3.4–10.8)

## 2019-10-30 PROCEDURE — C1894 INTRO/SHEATH, NON-LASER: HCPCS | Performed by: INTERNAL MEDICINE

## 2019-10-30 PROCEDURE — C1887 CATHETER, GUIDING: HCPCS | Performed by: INTERNAL MEDICINE

## 2019-10-30 PROCEDURE — 99153 MOD SED SAME PHYS/QHP EA: CPT | Performed by: INTERNAL MEDICINE

## 2019-10-30 PROCEDURE — 75630 X-RAY AORTA LEG ARTERIES: CPT | Performed by: INTERNAL MEDICINE

## 2019-10-30 PROCEDURE — 80053 COMPREHEN METABOLIC PANEL: CPT | Performed by: INTERNAL MEDICINE

## 2019-10-30 PROCEDURE — 99152 MOD SED SAME PHYS/QHP 5/>YRS: CPT | Performed by: INTERNAL MEDICINE

## 2019-10-30 PROCEDURE — 85025 COMPLETE CBC W/AUTO DIFF WBC: CPT | Performed by: INTERNAL MEDICINE

## 2019-10-30 PROCEDURE — 36245 INS CATH ABD/L-EXT ART 1ST: CPT | Performed by: INTERNAL MEDICINE

## 2019-10-30 PROCEDURE — 85610 PROTHROMBIN TIME: CPT | Performed by: INTERNAL MEDICINE

## 2019-10-30 PROCEDURE — 25010000002 MIDAZOLAM PER 1 MG: Performed by: INTERNAL MEDICINE

## 2019-10-30 PROCEDURE — C1769 GUIDE WIRE: HCPCS | Performed by: INTERNAL MEDICINE

## 2019-10-30 PROCEDURE — 25010000002 FENTANYL CITRATE (PF) 100 MCG/2ML SOLUTION: Performed by: INTERNAL MEDICINE

## 2019-10-30 PROCEDURE — 36415 COLL VENOUS BLD VENIPUNCTURE: CPT | Performed by: INTERNAL MEDICINE

## 2019-10-30 PROCEDURE — 25010000002 IOPAMIDOL 61 % SOLUTION: Performed by: INTERNAL MEDICINE

## 2019-10-30 PROCEDURE — 25010000002 HEPARIN (PORCINE) PER 1000 UNITS: Performed by: INTERNAL MEDICINE

## 2019-10-30 RX ORDER — LORAZEPAM 1 MG/1
1 TABLET ORAL EVERY 6 HOURS PRN
Status: DISCONTINUED | OUTPATIENT
Start: 2019-10-30 | End: 2019-10-30 | Stop reason: HOSPADM

## 2019-10-30 RX ORDER — ONDANSETRON 4 MG/1
4 TABLET, FILM COATED ORAL EVERY 8 HOURS PRN
Status: DISCONTINUED | OUTPATIENT
Start: 2019-10-30 | End: 2019-10-30 | Stop reason: HOSPADM

## 2019-10-30 RX ORDER — SODIUM CHLORIDE 9 MG/ML
100 INJECTION, SOLUTION INTRAVENOUS ONCE
Status: DISCONTINUED | OUTPATIENT
Start: 2019-10-30 | End: 2019-10-30 | Stop reason: HOSPADM

## 2019-10-30 RX ORDER — ONDANSETRON 2 MG/ML
4 INJECTION INTRAMUSCULAR; INTRAVENOUS EVERY 6 HOURS PRN
Status: DISCONTINUED | OUTPATIENT
Start: 2019-10-30 | End: 2019-10-30 | Stop reason: HOSPADM

## 2019-10-30 RX ORDER — ASPIRIN 81 MG/1
81 TABLET ORAL DAILY
Status: DISCONTINUED | OUTPATIENT
Start: 2019-10-30 | End: 2019-10-30 | Stop reason: HOSPADM

## 2019-10-30 RX ORDER — ATORVASTATIN CALCIUM 20 MG/1
20 TABLET, FILM COATED ORAL DAILY
Status: DISCONTINUED | OUTPATIENT
Start: 2019-10-30 | End: 2019-10-30 | Stop reason: HOSPADM

## 2019-10-30 RX ORDER — AMLODIPINE BESYLATE 10 MG/1
10 TABLET ORAL DAILY
Status: DISCONTINUED | OUTPATIENT
Start: 2019-10-30 | End: 2019-10-30 | Stop reason: HOSPADM

## 2019-10-30 RX ORDER — SODIUM CHLORIDE 9 MG/ML
INJECTION, SOLUTION INTRAVENOUS CONTINUOUS PRN
Status: COMPLETED | OUTPATIENT
Start: 2019-10-30 | End: 2019-10-30

## 2019-10-30 RX ORDER — MIDAZOLAM HYDROCHLORIDE 1 MG/ML
INJECTION INTRAMUSCULAR; INTRAVENOUS AS NEEDED
Status: DISCONTINUED | OUTPATIENT
Start: 2019-10-30 | End: 2019-10-30 | Stop reason: HOSPADM

## 2019-10-30 RX ORDER — GABAPENTIN 300 MG/1
300 CAPSULE ORAL 3 TIMES DAILY
Status: DISCONTINUED | OUTPATIENT
Start: 2019-10-30 | End: 2019-10-30 | Stop reason: HOSPADM

## 2019-10-30 RX ORDER — FENTANYL CITRATE 50 UG/ML
INJECTION, SOLUTION INTRAMUSCULAR; INTRAVENOUS AS NEEDED
Status: DISCONTINUED | OUTPATIENT
Start: 2019-10-30 | End: 2019-10-30 | Stop reason: HOSPADM

## 2019-10-30 RX ORDER — ISOSORBIDE MONONITRATE 30 MG/1
30 TABLET, EXTENDED RELEASE ORAL
Status: DISCONTINUED | OUTPATIENT
Start: 2019-10-30 | End: 2019-10-30 | Stop reason: HOSPADM

## 2019-10-30 RX ORDER — LIDOCAINE HYDROCHLORIDE 20 MG/ML
INJECTION, SOLUTION INFILTRATION; PERINEURAL AS NEEDED
Status: DISCONTINUED | OUTPATIENT
Start: 2019-10-30 | End: 2019-10-30 | Stop reason: HOSPADM

## 2019-10-30 RX ORDER — PANTOPRAZOLE SODIUM 40 MG/1
40 TABLET, DELAYED RELEASE ORAL DAILY
Status: DISCONTINUED | OUTPATIENT
Start: 2019-10-30 | End: 2019-10-30 | Stop reason: HOSPADM

## 2019-10-30 RX ORDER — QUETIAPINE FUMARATE 300 MG/1
300 TABLET, FILM COATED ORAL NIGHTLY
Status: DISCONTINUED | OUTPATIENT
Start: 2019-10-30 | End: 2019-10-30 | Stop reason: HOSPADM

## 2019-10-30 RX ORDER — METOPROLOL SUCCINATE 50 MG/1
50 TABLET, EXTENDED RELEASE ORAL
Status: DISCONTINUED | OUTPATIENT
Start: 2019-10-30 | End: 2019-10-30 | Stop reason: HOSPADM

## 2019-10-30 RX ORDER — ONDANSETRON 4 MG/1
4 TABLET, FILM COATED ORAL EVERY 6 HOURS PRN
Status: DISCONTINUED | OUTPATIENT
Start: 2019-10-30 | End: 2019-10-30 | Stop reason: HOSPADM

## 2020-02-07 RX ORDER — TICAGRELOR 90 MG/1
TABLET ORAL
Qty: 180 TABLET | Refills: 0 | Status: SHIPPED | OUTPATIENT
Start: 2020-02-07 | End: 2020-02-26 | Stop reason: DRUGHIGH

## 2020-02-26 ENCOUNTER — OFFICE VISIT (OUTPATIENT)
Dept: CARDIOLOGY | Facility: CLINIC | Age: 65
End: 2020-02-26

## 2020-02-26 VITALS
HEART RATE: 72 BPM | SYSTOLIC BLOOD PRESSURE: 118 MMHG | BODY MASS INDEX: 19.12 KG/M2 | DIASTOLIC BLOOD PRESSURE: 70 MMHG | HEIGHT: 64 IN | WEIGHT: 112 LBS

## 2020-02-26 DIAGNOSIS — I73.9 PAD (PERIPHERAL ARTERY DISEASE) (HCC): ICD-10-CM

## 2020-02-26 DIAGNOSIS — I25.9 IHD (ISCHEMIC HEART DISEASE): Primary | ICD-10-CM

## 2020-02-26 DIAGNOSIS — I10 ESSENTIAL HYPERTENSION: ICD-10-CM

## 2020-02-26 DIAGNOSIS — Z72.0 TOBACCO ABUSE: ICD-10-CM

## 2020-02-26 DIAGNOSIS — E78.00 HYPERCHOLESTEREMIA: ICD-10-CM

## 2020-02-26 DIAGNOSIS — D50.8 OTHER IRON DEFICIENCY ANEMIA: ICD-10-CM

## 2020-02-26 DIAGNOSIS — I77.9 CAROTID ARTERY DISEASE, UNSPECIFIED LATERALITY, UNSPECIFIED TYPE (HCC): ICD-10-CM

## 2020-02-26 PROCEDURE — 99214 OFFICE O/P EST MOD 30 MIN: CPT | Performed by: NURSE PRACTITIONER

## 2020-02-26 RX ORDER — AMLODIPINE BESYLATE 10 MG/1
10 TABLET ORAL DAILY
Qty: 90 TABLET | Refills: 3 | Status: SHIPPED | OUTPATIENT
Start: 2020-02-26 | End: 2021-01-01

## 2020-02-26 RX ORDER — ASPIRIN 81 MG/1
81 TABLET ORAL DAILY
Qty: 90 TABLET | Refills: 3 | Status: SHIPPED | OUTPATIENT
Start: 2020-02-26

## 2020-02-26 RX ORDER — ISOSORBIDE MONONITRATE 30 MG/1
30 TABLET, EXTENDED RELEASE ORAL DAILY
Qty: 90 TABLET | Refills: 3 | Status: SHIPPED | OUTPATIENT
Start: 2020-02-26 | End: 2021-01-01

## 2020-02-26 RX ORDER — METOPROLOL SUCCINATE 25 MG/1
25 TABLET, EXTENDED RELEASE ORAL 2 TIMES DAILY
Qty: 180 TABLET | Refills: 3 | Status: SHIPPED | OUTPATIENT
Start: 2020-02-26 | End: 2021-01-01 | Stop reason: SDUPTHER

## 2020-02-26 RX ORDER — HYDROCHLOROTHIAZIDE 25 MG/1
25 TABLET ORAL DAILY
Qty: 90 TABLET | Refills: 3 | Status: SHIPPED | OUTPATIENT
Start: 2020-02-26 | End: 2021-01-01

## 2020-02-26 RX ORDER — ATORVASTATIN CALCIUM 20 MG/1
20 TABLET, FILM COATED ORAL NIGHTLY
Qty: 90 TABLET | Refills: 3 | Status: SHIPPED | OUTPATIENT
Start: 2020-02-26

## 2020-02-26 NOTE — PROGRESS NOTES
Chief Complaint   Patient presents with   • Follow-up     Cardiac management. Had peripheral angiogram 10/2019.   • Lab     Last labs in chart.   • Shortness of Breath     Has occasional with exertion.   • Pain     Continues to have pain in legs.   • Med Refill     Needs refills on cardiac medication-90 day. Patient verbalized current medications.       Subjective       Vikki Herrmann is a 64 y.o. female with HTN, hyperlipidemia, PVD, and IHD diagnosed in 2017 when she underwent stenting of the RCA in [place]. She also had aorto-iliac bypass grafting. She moved to [place] and established care here in 2018. She was also told she had carotid stenosis but did not follow up with her surgeon. After establishing care, she was taking no statin or anti-platelet. Brilinta 60mg BID and Lipitor 40 mg given at consult. Stress test and echo showed small inferior ischemia, normal LVEF. Imdur added. When she returned for follow up, claudication pain reported. MAHI showed R 0.91, L 0.32. CTA legs showed severe stenosis. CTA neck was reported as normal. She was then referred to Dr. Astudillo for peripheral angiogram with intervention if needed. During this time, she was found to be anemic with hgb 7.7, advised to go to [facility] where she was transfused. She underwent peripheral angiogram on 10/19/19 showing aorto-Iliac graft with right patent but  of left iliac that reconstituted at the femoral. Distal vessels were patent. Medical management with walking regimen and smoking cessation recommended.     She came today for follow up. Denies chest pain. Chronically SOB. No TIA, dizziness or syncope. She has been unsuccessful with smoking cessation. She continues to have leg pain with walking, can walk about 100 ft then stops. She is tolerating her medications other than 1 episode of epistaxis about a month ago. Hgb improved to 11.7 after transfusion. Lipids on 8/1/19 improved to , LDL 53, HDL 29, Tri 208. She appears to be on Brilinta 90 mg  rather than 60 mg. She admits to smoking marijuana a couple of times daily to help her deal with chronic pain in her legs.     HPI         Cardiac History:    Past Surgical History:   Procedure Laterality Date   • CARDIAC CATHETERIZATION  10/16/2017    @ [place]. 85% Ostial RCA-3.25x15 Xience   • CARDIAC CATHETERIZATION Right 10/30/2019    Procedure: Peripheral angiography;  Surgeon: Qamar Astudillo MD;  Location: [facility] CATH INVASIVE LOCATION;  Service: Cardiovascular   • CARDIOVASCULAR STRESS TEST  08/22/2018    L. Cardiolite- EF 62%. Small Inferior Ischemia.   • DOPPLER ANKLE BRACHIAL INDEX MULTI LEVEL CAR  08/02/2019    R- 0.91, L- 0.32   • ECHO - CONVERTED  10/09/2017    @ [place]. EF 55%. Anterior & anterolateral WMA   • ECHO - CONVERTED  08/22/2018    EF 65%. Mild MR & AI.   • OTHER SURGICAL HISTORY  08/02/2019    ([facility]) CTA neck- no flow limiting stenosis bilaterally, nodular thyroid   • OTHER SURGICAL HISTORY  10/30/2019    Peripheral angio- aorto-iliac graft, R- patent, L-  iliac that reconstitutes at common femoral, moderate tibio-peroneal trunk, patent A/P tibialis      Current Outpatient Medications   Medication Sig Dispense Refill   • amLODIPine (NORVASC) 10 MG tablet Take 1 tablet by mouth Daily. 90 tablet 3   • aspirin 81 MG EC tablet Take 1 tablet by mouth Daily. 90 tablet 3   • atorvastatin (LIPITOR) 20 MG tablet Take 1 tablet by mouth Every Night. 90 tablet 3   • busPIRone (BUSPAR) 15 MG tablet Take 15 mg by mouth 2 (Two) Times a Day As Needed.     • gabapentin (NEURONTIN) 300 MG capsule Take 300 mg by mouth 3 (Three) Times a Day.     • hydroCHLOROthiazide (HYDRODIURIL) 25 MG tablet Take 1 tablet by mouth Daily. 90 tablet 3   • isosorbide mononitrate (IMDUR) 30 MG 24 hr tablet Take 1 tablet by mouth Daily. 90 tablet 3   • linaclotide (LINZESS) 290 MCG capsule capsule Take 290 mcg by mouth Daily As Needed.     • metoprolol succinate XL (TOPROL-XL) 25 MG 24 hr tablet Take 1 tablet by mouth 2 (Two) Times a  Day. 180 tablet 3   • ondansetron (ZOFRAN) 4 MG tablet Take 4 mg by mouth Every 8 (Eight) Hours As Needed for Nausea or Vomiting.     • pantoprazole (PROTONIX) 40 MG EC tablet Take 40 mg by mouth Daily.     • QUEtiapine (SEROquel) 300 MG tablet Take 300 mg by mouth Every Night.     • ticagrelor (BRILINTA) 60 MG tablet tablet Take 1 tablet by mouth 2 (Two) Times a Day. 180 tablet 3     No current facility-administered medications for this visit.      Patient has no known allergies.    Past Medical History:   Diagnosis Date   • Anxiety    • Coronary artery disease     s/p stenting    • Depression    • GERD (gastroesophageal reflux disease)    • History of cholecystectomy    • History of partial hysterectomy    • Hyperlipidemia    • Hypertension    • PVD (peripheral vascular disease) (CMS/[facility])     s/p multiple stents   • S/P AAA repair    • Uterine cancer (CMS/[facility])      Social History     Socioeconomic History   • Marital status:      Spouse name: Not on file   • Number of children: Not on file   • Years of education: Not on file   • Highest education level: Not on file   Tobacco Use   • Smoking status: Current Every Day Smoker     Packs/day: 0.50     Years: 50.00     Pack years: 25.00     Types: Cigarettes   • Smokeless tobacco: Never Used   Substance and Sexual Activity   • Alcohol use: No     Frequency: Never   • Drug use: Yes     Frequency: 14.0 times per week     Types: Marijuana   • Sexual activity: Defer     Family History   Problem Relation Age of Onset   • Stroke Mother    • Cancer Father      Review of Systems   Constitution: Positive for weight loss (down 3 lb). Negative for malaise/fatigue.   HENT: Positive for nosebleeds.    Eyes: Negative for blurred vision.   Cardiovascular: Positive for claudication and dyspnea on exertion. Negative for chest pain, leg swelling, palpitations and syncope.   Respiratory: Positive for cough and shortness of breath.    Endocrine: Negative.    Hematologic/Lymphatic:  "Negative.    Skin: Negative.    Musculoskeletal: Positive for muscle weakness (legs). Negative for myalgias.   Gastrointestinal: Negative for abdominal pain and melena.   Genitourinary: Negative for dysuria and hematuria.   Neurological: Negative for dizziness.   Psychiatric/Behavioral: Negative for altered mental status and depression.   Allergic/Immunologic: Negative.       Diabetes- No  Thyroid-normal    Objective     /70 (BP Location: Left arm)   Pulse 72   Ht 162.6 cm (64.02\")   Wt 50.8 kg (112 lb)   BMI 19.22 kg/m²     Physical Exam   Constitutional: She is oriented to person, place, and time. She appears well-developed and well-nourished. No distress.   HENT:   Head: Normocephalic.   Eyes: Pupils are equal, round, and reactive to light.   Neck: Normal range of motion. Carotid bruit is present.   Cardiovascular: Normal rate and regular rhythm. Exam reveals decreased pulses.   Pulmonary/Chest: Effort normal and breath sounds normal.   Abdominal: Soft. Bowel sounds are normal.   Musculoskeletal: Normal range of motion. She exhibits no edema.   Neurological: She is alert and oriented to person, place, and time.   Skin: Skin is warm and dry. She is not diaphoretic.   Psychiatric: She has a normal mood and affect.   Nursing note and vitals reviewed.     Procedures          Assessment/Plan      Vikki was seen today for follow-up, lab, shortness of breath, pain and med refill.    Diagnoses and all orders for this visit:    IHD (ischemic heart disease)  -     metoprolol succinate XL (TOPROL-XL) 25 MG 24 hr tablet; Take 1 tablet by mouth 2 (Two) Times a Day.  -     isosorbide mononitrate (IMDUR) 30 MG 24 hr tablet; Take 1 tablet by mouth Daily.  -     ticagrelor (BRILINTA) 60 MG tablet tablet; Take 1 tablet by mouth 2 (Two) Times a Day.  -     aspirin 81 MG EC tablet; Take 1 tablet by mouth Daily.  -     Comprehensive Metabolic Panel; Future    Hypercholesteremia  -     atorvastatin (LIPITOR) 20 MG tablet; " Take 1 tablet by mouth Every Night.  -     Comprehensive Metabolic Panel; Future  -     Lipid Panel; Future    PAD (peripheral artery disease) (CMS/HCC)  -     ticagrelor (BRILINTA) 60 MG tablet tablet; Take 1 tablet by mouth 2 (Two) Times a Day.  -     aspirin 81 MG EC tablet; Take 1 tablet by mouth Daily.  -     Comprehensive Metabolic Panel; Future    Carotid artery disease, unspecified laterality, unspecified type (CMS/HCC)  -     ticagrelor (BRILINTA) 60 MG tablet tablet; Take 1 tablet by mouth 2 (Two) Times a Day.  -     aspirin 81 MG EC tablet; Take 1 tablet by mouth Daily.  -     Comprehensive Metabolic Panel; Future    Essential hypertension  -     metoprolol succinate XL (TOPROL-XL) 25 MG 24 hr tablet; Take 1 tablet by mouth 2 (Two) Times a Day.  -     amLODIPine (NORVASC) 10 MG tablet; Take 1 tablet by mouth Daily.  -     hydroCHLOROthiazide (HYDRODIURIL) 25 MG tablet; Take 1 tablet by mouth Daily.  -     Comprehensive Metabolic Panel; Future  -     TSH; Future    Tobacco abuse    Other iron deficiency anemia  -     CBC (No Diff); Future    Heart rate and blood pressure stable. She did not bring her medications but reviewed with her verbally. Brilinta will be changed to 60 mg BID as she is on maintenance therapy with last stenting in 2017, especially with anemia and nose bleeding. We reviewed report from peripheral angiogram which showed chronic total occlusion of left iliac with reconstitution at the femoral. She has moderate mid leg disease and patent lower extremity arteries. She is encouraged to walk as often as she can. Continue aspirin and statin. Lipids are well controlled other than trigs which are high. She is tolerating Imdur without chest pain, so will continue to manage her medically. Regarding the carotids, she has a loud bruit on the right. CTA of neck done on 8/2/19 showed no hemodynamically significant stenosis. She is asymptomatic, so we will monitor. Recommend repeating ultrasound next  year to reassess. She was given a lab order for routine labs to check CBC, CMP, lipid, TSH. Smoking cessation is encouraged but she doesn't feel she can quit. She declined pharmacotherapy. Refills sent for her cardiac meds. We will see her back in six months or sooner if needed.      Patient's Body mass index is 19.22 kg/m². BMI is within normal parameters. No follow-up required..     Vikki Herrmann  reports that she has been smoking cigarettes. She has a 25.00 pack-year smoking history. She has never used smokeless tobacco.. I have educated her on the risk of diseases from using tobacco products such as cancer, COPD and heart diease.     I advised her to quit and she is not willing to quit.    I spent 3  minutes counseling the patient.            Electronically signed by PAO Beth,  February 28, 2020 4:25 PM

## 2021-01-01 ENCOUNTER — OFFICE VISIT (OUTPATIENT)
Dept: CARDIOLOGY | Facility: CLINIC | Age: 66
End: 2021-01-01

## 2021-01-01 VITALS
HEART RATE: 64 BPM | HEIGHT: 64 IN | DIASTOLIC BLOOD PRESSURE: 62 MMHG | WEIGHT: 109.7 LBS | SYSTOLIC BLOOD PRESSURE: 106 MMHG | BODY MASS INDEX: 18.73 KG/M2

## 2021-01-01 DIAGNOSIS — I25.9 IHD (ISCHEMIC HEART DISEASE): ICD-10-CM

## 2021-01-01 DIAGNOSIS — I25.9 IHD (ISCHEMIC HEART DISEASE): Primary | ICD-10-CM

## 2021-01-01 DIAGNOSIS — I73.9 PAD (PERIPHERAL ARTERY DISEASE) (HCC): ICD-10-CM

## 2021-01-01 DIAGNOSIS — I77.9 CAROTID ARTERY DISEASE, UNSPECIFIED LATERALITY, UNSPECIFIED TYPE (HCC): ICD-10-CM

## 2021-01-01 DIAGNOSIS — I10 ESSENTIAL HYPERTENSION: ICD-10-CM

## 2021-01-01 DIAGNOSIS — Z72.0 TOBACCO ABUSE: ICD-10-CM

## 2021-01-01 DIAGNOSIS — E78.00 HYPERCHOLESTEREMIA: ICD-10-CM

## 2021-01-01 DIAGNOSIS — R09.89 BILATERAL CAROTID BRUITS: ICD-10-CM

## 2021-01-01 PROCEDURE — 99214 OFFICE O/P EST MOD 30 MIN: CPT | Performed by: NURSE PRACTITIONER

## 2021-01-01 RX ORDER — ISOSORBIDE MONONITRATE 30 MG/1
TABLET, EXTENDED RELEASE ORAL
Qty: 30 TABLET | Refills: 0 | Status: SHIPPED | OUTPATIENT
Start: 2021-01-01 | End: 2021-01-01

## 2021-01-01 RX ORDER — AMLODIPINE BESYLATE 10 MG/1
10 TABLET ORAL DAILY
Qty: 90 TABLET | Refills: 3 | Status: SHIPPED | OUTPATIENT
Start: 2021-01-01

## 2021-01-01 RX ORDER — HYDROCHLOROTHIAZIDE 25 MG/1
TABLET ORAL
Qty: 30 TABLET | Refills: 0 | Status: SHIPPED | OUTPATIENT
Start: 2021-01-01 | End: 2021-01-01 | Stop reason: SDUPTHER

## 2021-01-01 RX ORDER — ISOSORBIDE MONONITRATE 30 MG/1
30 TABLET, EXTENDED RELEASE ORAL DAILY
Qty: 90 TABLET | Refills: 3 | Status: SHIPPED | OUTPATIENT
Start: 2021-01-01

## 2021-01-01 RX ORDER — ISOSORBIDE MONONITRATE 30 MG/1
TABLET, EXTENDED RELEASE ORAL
Qty: 90 TABLET | Refills: 2 | Status: SHIPPED | OUTPATIENT
Start: 2021-01-01 | End: 2021-01-01 | Stop reason: SDUPTHER

## 2021-01-01 RX ORDER — HYDROCHLOROTHIAZIDE 25 MG/1
25 TABLET ORAL DAILY
Qty: 90 TABLET | Refills: 3 | Status: SHIPPED | OUTPATIENT
Start: 2021-01-01

## 2021-01-01 RX ORDER — METOPROLOL SUCCINATE 25 MG/1
25 TABLET, EXTENDED RELEASE ORAL 2 TIMES DAILY
Qty: 180 TABLET | Refills: 3 | Status: SHIPPED | OUTPATIENT
Start: 2021-01-01

## 2021-01-01 RX ORDER — AMLODIPINE BESYLATE 10 MG/1
10 TABLET ORAL DAILY
Qty: 30 TABLET | Refills: 0 | Status: SHIPPED | OUTPATIENT
Start: 2021-01-01 | End: 2021-01-01 | Stop reason: SDUPTHER

## 2021-03-23 NOTE — TELEPHONE ENCOUNTER
Pt called to schedule appointment, asking for refills of Brilinta 60 mg BID to be sent to [facility] in [place].

## 2021-06-14 NOTE — PROGRESS NOTES
"Chief Complaint   Patient presents with   • Follow-up     Cardiac management . Has no cardiac complaints today.   • LABS     Had labs per PCP, with good report    • Med Refill     Needs refills on HCTZ, Amlodipine, Imdur and Brilinta  90 day supply to [facility] in [facility]       Vikki Herrmann is a 65 y.o. female  with HTN, hyperlipidemia, PVD, and IHD diagnosed in 2017 when she underwent stenting of the RCA in [place]. She also had aorto-iliac bypass grafting. She moved to [place] and established care here in 2018. She was also told she had carotid stenosis but did not follow up with her surgeon. After establishing care, she was taking no statin or anti-platelet. Brilinta 60mg BID and Lipitor 40 mg given at consult. Stress test and echo showed small inferior ischemia, normal LVEF. Imdur added. When she returned for follow up, claudication pain reported. MAHI showed R 0.91, L 0.32. CTA legs showed severe stenosis. CTA neck was reported as normal. She was then referred to Dr. Astudillo for peripheral angiogram with intervention if needed. During this time, she was found to be anemic with hgb 7.7, advised to go to [facility] where she was transfused. She underwent peripheral angiogram on 10/19/19 showing aorto-Iliac graft with right patent but  of left iliac that reconstituted at the femoral. Distal vessels were patent. Medical management with walking regimen and smoking cessation recommended.     Today she comes the office for follow-up visit.  She denies chest pain, palpitations, dizziness, edema, or increase shortness of breath.  Claudication pain is \"the same\".  She does walk daily short distances then rest when she develops pain.  Unfortunately she has not been able to stop smoking.    Cardiac History:    Past Surgical History:   Procedure Laterality Date   • CARDIAC CATHETERIZATION  10/16/2017    @ [place]. 85% Ostial RCA-3.25x15 Xience   • CARDIAC CATHETERIZATION Right 10/30/2019    Procedure: Peripheral " angiography;  Surgeon: Qamar Astudillo MD;  Location: [facility] CATH INVASIVE LOCATION;  Service: Cardiovascular   • CARDIOVASCULAR STRESS TEST  08/22/2018    L. Cardiolite- EF 62%. Small Inferior Ischemia.   • DOPPLER ANKLE BRACHIAL INDEX MULTI LEVEL CAR  08/02/2019    R- 0.91, L- 0.32   • ECHO - CONVERTED  10/09/2017    @ [place]. EF 55%. Anterior & anterolateral WMA   • ECHO - CONVERTED  08/22/2018    EF 65%. Mild MR & AI.   • OTHER SURGICAL HISTORY  08/02/2019    ([facility]) CTA neck- no flow limiting stenosis bilaterally, nodular thyroid   • OTHER SURGICAL HISTORY  10/30/2019    Peripheral angio- aorto-iliac graft, R- patent, L-  iliac that reconstitutes at common femoral, moderate tibio-peroneal trunk, patent A/P tibialis        Current Outpatient Medications   Medication Sig Dispense Refill   • amLODIPine (NORVASC) 10 MG tablet Take 1 tablet by mouth Daily. 90 tablet 3   • aspirin 81 MG EC tablet Take 1 tablet by mouth Daily. 90 tablet 3   • atorvastatin (LIPITOR) 20 MG tablet Take 1 tablet by mouth Every Night. 90 tablet 3   • busPIRone (BUSPAR) 15 MG tablet Take 15 mg by mouth 2 (Two) Times a Day As Needed.     • gabapentin (NEURONTIN) 300 MG capsule Take 300 mg by mouth 3 (Three) Times a Day.     • hydroCHLOROthiazide (HYDRODIURIL) 25 MG tablet Take 1 tablet by mouth Daily. 90 tablet 3   • isosorbide mononitrate (IMDUR) 30 MG 24 hr tablet Take 1 tablet by mouth Daily. 90 tablet 3   • linaclotide (LINZESS) 290 MCG capsule capsule Take 290 mcg by mouth Daily As Needed.     • metoprolol succinate XL (TOPROL-XL) 25 MG 24 hr tablet Take 1 tablet by mouth 2 (Two) Times a Day. 180 tablet 3   • ondansetron (ZOFRAN) 4 MG tablet Take 4 mg by mouth Every 8 (Eight) Hours As Needed for Nausea or Vomiting.     • pantoprazole (PROTONIX) 40 MG EC tablet Take 40 mg by mouth Daily.     • QUEtiapine (SEROquel) 300 MG tablet Take 300 mg by mouth Every Night.     • ticagrelor (Brilinta) 60 MG tablet tablet Take 1 tablet by mouth 2 (Two)  Times a Day. 180 tablet 3     No current facility-administered medications for this visit.       Patient has no known allergies.    Past Medical History:   Diagnosis Date   • Anxiety    • Coronary artery disease     s/p stenting    • Depression    • GERD (gastroesophageal reflux disease)    • History of cholecystectomy    • History of partial hysterectomy    • Hyperlipidemia    • Hypertension    • PVD (peripheral vascular disease) (CMS/HCC)     s/p multiple stents   • S/P AAA repair    • Uterine cancer (CMS/[facility])        Social History     Socioeconomic History   • Marital status:      Spouse name: Not on file   • Number of children: Not on file   • Years of education: Not on file   • Highest education level: Not on file   Tobacco Use   • Smoking status: Current Every Day Smoker     Packs/day: 0.50     Years: 50.00     Pack years: 25.00     Types: Cigarettes   • Smokeless tobacco: Never Used   Vaping Use   • Vaping Use: Never used   Substance and Sexual Activity   • Alcohol use: No   • Drug use: Yes     Frequency: 14.0 times per week     Types: Marijuana   • Sexual activity: Defer       Family History   Problem Relation Age of Onset   • Stroke Mother    • Cancer Father        Review of Systems   Constitutional: Negative for decreased appetite and malaise/fatigue.   HENT: Negative for nosebleeds.    Eyes: Negative for blurred vision.   Cardiovascular: Positive for claudication. Negative for chest pain, cyanosis, dyspnea on exertion, irregular heartbeat, leg swelling, near-syncope, orthopnea, palpitations, paroxysmal nocturnal dyspnea and syncope.   Respiratory: Positive for shortness of breath.    Endocrine: Negative for cold intolerance and heat intolerance.   Hematologic/Lymphatic: Negative for bleeding problem. Bruises/bleeds easily.   Skin: Negative for rash.   Musculoskeletal: Negative for myalgias.   Gastrointestinal: Negative for heartburn, melena and nausea.   Genitourinary: Negative for dysuria and  "hematuria.   Neurological: Negative for dizziness and light-headedness.   Psychiatric/Behavioral: The patient does not have insomnia and is not nervous/anxious.         BP Readings from Last 5 Encounters:   06/14/21 106/62   02/26/20 118/70   10/30/19 150/100   10/17/19 116/80   06/26/19 152/90       Wt Readings from Last 5 Encounters:   06/14/21 49.8 kg (109 lb 11.2 oz)   02/26/20 50.8 kg (112 lb)   10/30/19 52.2 kg (115 lb)   10/17/19 51.7 kg (114 lb)   06/26/19 50.3 kg (111 lb)       Objective     /62 (BP Location: Right arm)   Pulse 64   Ht 162.6 cm (64.02\")   Wt 49.8 kg (109 lb 11.2 oz)   BMI 18.82 kg/m²     Vitals and nursing note reviewed.   Eyes:      Pupils: Pupils are equal, round, and reactive to light.   HENT:      Head: Normocephalic.   Neck:      Vascular: Carotid bruit (not a new finding) present.   Pulmonary:      Effort: Pulmonary effort is normal.      Breath sounds: Decreased breath sounds present.   Cardiovascular:      Normal rate. Regular rhythm.   Pulses:     Decreased pulses.   Abdominal:      General: Bowel sounds are normal.      Palpations: Abdomen is soft.   Musculoskeletal: Normal range of motion.      Cervical back: Normal range of motion. Skin:     General: Skin is warm.   Neurological:      Mental Status: Alert and oriented to person, place, and time.          Procedures: none today         Assessment/Plan   Diagnoses and all orders for this visit:    1. IHD (ischemic heart disease) (Primary)  -     ticagrelor (Brilinta) 60 MG tablet tablet; Take 1 tablet by mouth 2 (Two) Times a Day.  Dispense: 180 tablet; Refill: 3  -     metoprolol succinate XL (TOPROL-XL) 25 MG 24 hr tablet; Take 1 tablet by mouth 2 (Two) Times a Day.  Dispense: 180 tablet; Refill: 3  -     isosorbide mononitrate (IMDUR) 30 MG 24 hr tablet; Take 1 tablet by mouth Daily.  Dispense: 90 tablet; Refill: 3    2. PAD (peripheral artery disease) (CMS/HCC)  -     ticagrelor (Brilinta) 60 MG tablet tablet; Take 1 " tablet by mouth 2 (Two) Times a Day.  Dispense: 180 tablet; Refill: 3    3. Essential hypertension  -     metoprolol succinate XL (TOPROL-XL) 25 MG 24 hr tablet; Take 1 tablet by mouth 2 (Two) Times a Day.  Dispense: 180 tablet; Refill: 3  -     hydroCHLOROthiazide (HYDRODIURIL) 25 MG tablet; Take 1 tablet by mouth Daily.  Dispense: 90 tablet; Refill: 3  -     amLODIPine (NORVASC) 10 MG tablet; Take 1 tablet by mouth Daily.  Dispense: 90 tablet; Refill: 3    4. Hypercholesteremia    5. Tobacco abuse    6. Carotid artery disease, unspecified laterality, unspecified type (CMS/HCC)  -     ticagrelor (Brilinta) 60 MG tablet tablet; Take 1 tablet by mouth 2 (Two) Times a Day.  Dispense: 180 tablet; Refill: 3    7. Bilateral carotid bruits      IHD-patient presents without cardiac complaints or concerns.  No repeat cardiac testing ordered at this time.  Continue DAPT, nitrate, statin and antihypertensive agents.    PAD-continue walking regimen.  Smoking cessation strongly encouraged.  Continue antiplatelet therapy.    Hypertension-blood pressure well controlled at this time.  Refills given for current antihypertensive agents.  No dosage changes made today.    Hypercholesterolemia-she will follow with you for lab orders/management.  Currently on Lipitor without side effects noted.    Patient's Body mass index is 18.82 kg/m². indicating that she is within normal range (BMI 18.5-24.9). No BMI management plan needed..     Tobacco abuse-Vikki Herrmann  reports that she has been smoking cigarettes. She has a 25.00 pack-year smoking history. She has never used smokeless tobacco.. I have educated her on the risk of diseases from using tobacco products such as cancer, COPD and heart disease.   Does not feel she can stop smoking at this time.    Carotid artery disease/bilateral carotid bruits-last CTA showed normal results.  Patient denies TIA type symptoms.  No repeat testing ordered at this time.    A 6-month follow-up visit  scheduled.  Please call sooner for chronic concerns.              Electronically signed by PAO Russell,  June 14, 2021 14:14 EDT

## 2021-06-14 NOTE — PATIENT INSTRUCTIONS
Advance Care Planning and Advance Directives     You make decisions on a daily basis - decisions about where you want to live, your career, your home, your life. Perhaps one of the most important decisions you face is your choice for future medical care. Take time to talk with your family and your healthcare team and start planning today.  Advance Care Planning is a process that can help you:  · Understand possible future healthcare decisions in light of your own experiences  · Reflect on those decision in light of your goals and values  · Discuss your decisions with those closest to you and the healthcare professionals that care for you  · Make a plan by creating a document that reflects your wishes    Surrogate Decision Maker  In the event of a medical emergency, which has left you unable to communicate or to make your own decisions, you would need someone to make decisions for you.  It is important to discuss your preferences for medical treatment with this person while you are in good health.     Qualities of a surrogate decision maker:  • Willing to take on this role and responsibility  • Knows what you want for future medical care  • Willing to follow your wishes even if they don't agree with them  • Able to make difficult medical decisions under stressful circumstances    Advance Directives  These are legal documents you can create that will guide your healthcare team and decision maker(s) when needed. These documents can be stored in the electronic medical record.    · Living Will - a legal document to guide your care if you have a terminal condition or a serious illness and are unable to communicate. States vary by statute in document names/types, but most forms may include one or more of the following:        -  Directions regarding life-prolonging treatments        -  Directions regarding artificially provided nutrition/hydration        -  Choosing a healthcare decision maker        -  Direction  regarding organ/tissue donation    · Durable Power of  for Healthcare - this document names an -in-fact to make medical decisions for you, but it may also allow this person to make personal and financial decisions for you. Please seek the advice of an  if you need this type of document.    **Advance Directives are not required and no one may discriminate against you if you do not sign one.    Medical Orders  Many states allow specific forms/orders signed by your physician to record your wishes for medical treatment in your current state of health. This form, signed in personal communication with your physician, addresses resuscitation and other medical interventions that you may or may not want.      For more information or to schedule a time with a [facility] Advance Care Planning Facilitator contact: [email or web address] or call [phone] and someone will contact you directly.

## 2022-09-03 DIAGNOSIS — I25.9 IHD (ISCHEMIC HEART DISEASE): ICD-10-CM

## 2022-09-03 DIAGNOSIS — I73.9 PAD (PERIPHERAL ARTERY DISEASE): ICD-10-CM

## 2022-09-03 DIAGNOSIS — I77.9 CAROTID ARTERY DISEASE, UNSPECIFIED LATERALITY, UNSPECIFIED TYPE: ICD-10-CM

## 2022-09-03 DIAGNOSIS — I10 ESSENTIAL HYPERTENSION: ICD-10-CM

## 2022-09-03 RX ORDER — ISOSORBIDE MONONITRATE 30 MG/1
30 TABLET, EXTENDED RELEASE ORAL DAILY
Qty: 90 TABLET | Refills: 3 | OUTPATIENT
Start: 2022-09-03

## 2022-09-03 RX ORDER — TICAGRELOR 60 MG/1
TABLET ORAL
Qty: 180 TABLET | Refills: 3 | OUTPATIENT
Start: 2022-09-03

## 2022-09-03 RX ORDER — HYDROCHLOROTHIAZIDE 25 MG/1
25 TABLET ORAL DAILY
Qty: 90 TABLET | Refills: 3 | OUTPATIENT
Start: 2022-09-03

## 2022-09-03 RX ORDER — METOPROLOL SUCCINATE 25 MG/1
TABLET, EXTENDED RELEASE ORAL
Qty: 180 TABLET | Refills: 3 | OUTPATIENT
Start: 2022-09-03

## (undated) DEVICE — DRSNG SURESITE WNDW 4X4.5

## (undated) DEVICE — ST INF PRI SMRTSTE 20DRP 2VLV 24ML 117

## (undated) DEVICE — LN INJ CONTRST FLXCIL HP F/M LL 1200PSI10

## (undated) DEVICE — CANNULA,OXY,ADULT,SUPER SOFT,W/14'TUB,UC: Brand: MEDLINE INDUSTRIES, INC.

## (undated) DEVICE — KT MINI ACC 5F .18X40CM SS 21G 7CM

## (undated) DEVICE — RADIFOCUS GLIDEWIRE: Brand: GLIDEWIRE

## (undated) DEVICE — GLIDESHEATH SLENDER STAINLESS STEEL KIT: Brand: GLIDESHEATH SLENDER

## (undated) DEVICE — DRAPE, RADIAL, STERILE: Brand: MEDLINE

## (undated) DEVICE — ST EXT IV SMARTSITE 2VLV SP M LL 5ML IV1

## (undated) DEVICE — SHEATH INTRO SUPERSHEATH SHRT .035 4F 11CM

## (undated) DEVICE — RUNWAY RADL W/TOP PAD

## (undated) DEVICE — ADULT DISPOSABLE SINGLE-PATIENT USE PULSE OXIMETER SENSOR: Brand: NONIN

## (undated) DEVICE — CATH SUP PROC TRAILBLAZER ANG .035IN 150CM

## (undated) DEVICE — TR BAND RADIAL ARTERY COMPRESSION DEVICE: Brand: TR BAND

## (undated) DEVICE — PK CATH CARD 70

## (undated) DEVICE — Device: Brand: MEDEX